# Patient Record
Sex: FEMALE | URBAN - METROPOLITAN AREA
[De-identification: names, ages, dates, MRNs, and addresses within clinical notes are randomized per-mention and may not be internally consistent; named-entity substitution may affect disease eponyms.]

---

## 2019-10-25 ENCOUNTER — OFFICE VISIT (OUTPATIENT)
Dept: URBAN - METROPOLITAN AREA CLINIC 29 | Facility: CLINIC | Age: 54
End: 2019-10-25
Payer: COMMERCIAL

## 2019-10-25 DIAGNOSIS — E11.9 TYPE 2 DIABETES MELLITUS W/O COMPLICATION: Primary | ICD-10-CM

## 2019-10-25 PROCEDURE — 92004 COMPRE OPH EXAM NEW PT 1/>: CPT | Performed by: OPTOMETRIST

## 2019-10-25 ASSESSMENT — INTRAOCULAR PRESSURE
OS: 17
OD: 16

## 2019-10-25 NOTE — IMPRESSION/PLAN
Impression: Type 2 diabetes mellitus w/o complication: M61.3. No Diabetic related eye Disease OU Plan: Discussed diagnosis in detail with patient. Emphasized blood sugar control. Will continue to observe condition and or symptoms. Call if symptoms occur.

## 2025-04-30 ENCOUNTER — OFFICE VISIT (OUTPATIENT)
Dept: URBAN - METROPOLITAN AREA CLINIC 30 | Facility: CLINIC | Age: 60
End: 2025-04-30
Payer: COMMERCIAL

## 2025-04-30 DIAGNOSIS — H25.813 COMBINED FORMS OF AGE-RELATED CATARACT, BILATERAL: ICD-10-CM

## 2025-04-30 DIAGNOSIS — H04.123 DRY EYE SYNDROME OF BILATERAL LACRIMAL GLANDS: ICD-10-CM

## 2025-04-30 DIAGNOSIS — E11.9 TYPE 2 DIABETES MELLITUS WITHOUT COMPLICATIONS: Primary | ICD-10-CM

## 2025-04-30 PROCEDURE — 92134 CPTRZ OPH DX IMG PST SGM RTA: CPT

## 2025-04-30 PROCEDURE — 99204 OFFICE O/P NEW MOD 45 MIN: CPT

## 2025-04-30 ASSESSMENT — KERATOMETRY
OS: 42.63
OD: 43.00

## 2025-04-30 ASSESSMENT — VISUAL ACUITY
OS: 20/25
OD: 20/25

## 2025-04-30 ASSESSMENT — INTRAOCULAR PRESSURE
OS: 16
OD: 16